# Patient Record
Sex: FEMALE | URBAN - METROPOLITAN AREA
[De-identification: names, ages, dates, MRNs, and addresses within clinical notes are randomized per-mention and may not be internally consistent; named-entity substitution may affect disease eponyms.]

---

## 2017-01-06 NOTE — PATIENT DISCUSSION
(W87.2837) Primary open-angle glaucoma left eye moderate stage - Assesment : Examination revealed Primary Open Angle Glaucoma. HVF stable today. - Plan : Monitor for changes. Continue Lumigan 0.01% qhs OU. RTC in April for Exam and OCT Rich Mccarty 76, sooner for problems.

## 2017-01-06 NOTE — PATIENT DISCUSSION
(H25.13) Age-related nuclear cataract, bilateral - Assesment : Examination revealed cataract. Patient is symptomatic and bothered. Pt is done with chemo and may wish to proceed with cataract surgery. - Plan : Monitor for changes. Discussed visual symptoms of cataracts worsening. Pt returning in April, will evaluate cataracts then.

## 2017-01-06 NOTE — PATIENT DISCUSSION
(H40.1111) Primary open-angle glaucoma right eye mild stage - Assesment : Examination revealed Primary Open Angle Glaucoma. HVF stable today. - Plan : Monitor for changes. Continue Lumigan 0.01% qhs OU. RTC in April for Exam and OCT Rich Mccarty 76, sooner for problems.

## 2017-04-11 NOTE — PATIENT DISCUSSION
(P32.4890) Primary open-angle glaucoma left eye moderate stage - Assesment : Examination revealed Primary Open Angle Glaucoma. OCT and IOP stable today. - Plan : Monitor for changes. Continue Lumigan 0.01% qhs OU. RTC in 6 months for HVF and IOP Check, sooner for problems.

## 2017-04-11 NOTE — PATIENT DISCUSSION
(H25.13) Age-related nuclear cataract, bilateral - Assesment : Examination revealed significant cataracts OU. OS>OD. Patient is symptomatic and bothered. - Plan : Recommended cataract extraction to improve visual function. Discussed procedure, risks, benefits, and lens options. Pt wishes to proceed. Schedule ASCAN and consult.

## 2017-04-11 NOTE — PATIENT DISCUSSION
(H25.012) Cortical age-related cataract, left eye - Assesment : Examination revealed Cortical Senile Cataract. - Plan : see plan #3.

## 2017-04-11 NOTE — PATIENT DISCUSSION
(H40.1111) Primary open-angle glaucoma right eye mild stage - Assesment : Examination revealed Primary Open Angle Glaucoma. OCT and IOP stable today. - Plan : Monitor for changes. Continue Lumigan 0.01% qhs OU. RTC in 6 months for HVF and IOP Check, sooner for problems.

## 2017-05-31 NOTE — PATIENT DISCUSSION
(H35.373) Puckering of macula, bilateral - Assesment : Examination revealed ERM.  mild OU - Plan : monitor

## 2017-05-31 NOTE — PATIENT DISCUSSION
(H25.013) Cortical age-related cataract, bilateral - Assesment : Examination revealed Cortical Senile Cataract. OS>OD - Plan : Monitor for changes. Advised patient to call our office with decreased vision or increased symptoms.

## 2017-05-31 NOTE — PATIENT DISCUSSION
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 - Plan : Monitor for changes. Advised patient to call our office with decreased vision or increased symptoms.

## 2017-06-14 NOTE — PATIENT DISCUSSION
(Z96.1) Presence of intraocular lens - Assesment : Patient is Pseudophakic. ORA done in OR during surgery. healing well - Plan : Discussed signs and symptoms of infection and retinal detachments. Do not rub operated eye. Follow drop schedule If redness,pain,decreased vision, flashes or floaters occur then contact clinic.  1 week refraction/dilation

## 2017-07-03 NOTE — PATIENT DISCUSSION
(Z96.1) Presence of intraocular lens - Assesment : Patient is Pseudophakic. Doing well OU. - Plan : Monitor. Signs and symptoms of infection and retinal detachment are outlined in your surgical packet. Do not rub operated eye. Continue to follow drop schedule. If redness, pain, decreased vision, flashes or floaters occur then contact clinic. RTC for 1 month PO as scheduled and then in October for IOP Check and HVF with CHERISE as scheduled.

## 2017-07-31 NOTE — PATIENT DISCUSSION
(H35.373) Puckering of macula, bilateral - Assesment : Examination revealed ERM. - Plan : Monitor. Advised patient to call our office with decreased vision or increased symptoms. Optical coherence tomography performed.

## 2017-10-30 NOTE — PATIENT DISCUSSION
(H40.1111) Primary open-angle glaucoma right eye mild stage - Assesment : Examination revealed Primary Open Angle Glaucoma. HVF today. - Plan : Monitor for IOP and NFL changes with visits and testing. Continue Lumigan 0.01% qhs OU. RTC in 6 months for Exam and OCT ONH, sooner for problems.

## 2017-10-30 NOTE — PATIENT DISCUSSION
(J32.0594) Primary open-angle glaucoma left eye moderate stage - Assesment : Examination revealed Primary Open Angle Glaucoma. HVF today. - Plan : Monitor for IOP and NFL changes with visits and testing. Continue Lumigan 0.01% qhs OU. RTC in 6 months for Exam and OCT ONH, sooner for problems.

## 2018-04-30 NOTE — PATIENT DISCUSSION
(H40.1111) Primary open-angle glaucoma right eye mild stage - Assesment : Examination revealed Primary Open Angle Glaucoma. OCT ONH stable today. - Plan : Monitor for IOP and NFL changes with visits and testing. Continue Lumigan 0.01% qhs OU. RTC in 6 months for HVF and IOP Check, sooner for problems.

## 2018-04-30 NOTE — PATIENT DISCUSSION
(B79.930) Other secondary cataract, bilateral - Assesment : Mild posterior capsule opacification present. - Plan : Monitor for Changes. Advised patient to call our office with decreased vision or increased symptoms. Updated GLRx given today.

## 2018-04-30 NOTE — PATIENT DISCUSSION
(W15.8418) Primary open-angle glaucoma left eye moderate stage - Assesment : Examination revealed Primary Open Angle Glaucoma. OCT ONH stable today. - Plan : Monitor for IOP and NFL changes with visits and testing. Continue Lumigan 0.01% qhs OU. RTC in 6 months for HVF and IOP Check, sooner for problems.

## 2018-11-12 NOTE — PATIENT DISCUSSION
(H40.1111) Primary open-angle glaucoma right eye mild stage - Assesment : Examination revealed Primary Open Angle Glaucoma. VF stable today. - Plan : Monitor for IOP and NFL changes with visits and testing. Continue Lumigan 0.01% qhs OU. RTC in 6 months for Exam/OCTO , sooner for problems.

## 2018-11-12 NOTE — PATIENT DISCUSSION
(S47.5553) Primary open-angle glaucoma left eye moderate stage - Assesment : Examination revealed Primary Open Angle Glaucoma. - Plan : Monitor for IOP and NFL changes with visits and testing. Continue Lumigan 0.01% qhs OU. RTC in 6 months for Exam/OCTO, sooner for problems.

## 2019-05-14 NOTE — PATIENT DISCUSSION
(H40.1111) Primary open-angle glaucoma right eye mild stage - Assesment : Examination revealed Primary Open Angle Glaucoma. OCT ONH performed: stable today. - Plan : Monitor for IOP and NFL changes with visits, OCTs, and HVFs. Continue Lumigan 0.01% qhs OU. RTC in 6 months for HVF and IOP Check, sooner for problems.

## 2019-05-14 NOTE — PATIENT DISCUSSION
(D72.2579) Primary open-angle glaucoma left eye moderate stage - Assesment : Examination revealed Primary Open Angle Glaucoma. OCT ONH performed: stable today. - Plan : Monitor for IOP and NFL changes with visits, OCTs, and HVFs. Continue Lumigan 0.01% qhs OU. RTC in 6 months for HVF and IOP Check, sooner for problems.

## 2019-05-14 NOTE — PATIENT DISCUSSION
(N16.396) Other secondary cataract, bilateral - Assesment : Mild posterior capsule opacification present. - Plan : Monitor for changes. Advised patient to call our office with decreased vision or increased symptoms.

## 2020-05-19 ENCOUNTER — IMPORTED ENCOUNTER (OUTPATIENT)
Dept: URBAN - METROPOLITAN AREA CLINIC 9 | Facility: CLINIC | Age: 70
End: 2020-05-19

## 2021-10-18 ASSESSMENT — VISUAL ACUITY
OS_SC: 20/30 SN
OD_SC: 20/25 -2 SN

## 2022-06-26 NOTE — PATIENT DISCUSSION
Monitor for changes. Advised patient to call our office with decreased vision or increased symptoms. no pain, swelling or deformity of joints

## 2023-04-03 NOTE — PATIENT DISCUSSION
Monitor for changes. Advised patient to call our office with decreased vision or increased symptoms. Doxycycline Pregnancy And Lactation Text: This medication is Pregnancy Category D and not consider safe during pregnancy. It is also excreted in breast milk but is considered safe for shorter treatment courses.

## 2024-02-20 ENCOUNTER — CONSULTATION/EVALUATION (OUTPATIENT)
Facility: LOCATION | Age: 74
End: 2024-02-20

## 2024-02-20 DIAGNOSIS — H02.832: ICD-10-CM

## 2024-02-20 DIAGNOSIS — H02.835: ICD-10-CM

## 2024-02-20 DIAGNOSIS — H02.831: ICD-10-CM

## 2024-02-20 DIAGNOSIS — H02.834: ICD-10-CM

## 2024-02-20 DIAGNOSIS — H02.413: ICD-10-CM

## 2024-02-20 PROCEDURE — 92285 EXTERNAL OCULAR PHOTOGRAPHY: CPT

## 2024-02-20 PROCEDURE — 92002 INTRM OPH EXAM NEW PATIENT: CPT

## 2024-02-20 PROCEDURE — 92081 LIMITED VISUAL FIELD XM: CPT

## 2024-02-20 RX ORDER — ERYTHROMYCIN 5 MG/G: OINTMENT OPHTHALMIC TWICE A DAY

## 2024-02-20 ASSESSMENT — VISUAL ACUITY
OU_SC: 20/30-1
OS_SC: 20/40
OD_SC: 20/30-1